# Patient Record
Sex: FEMALE | Race: WHITE | URBAN - METROPOLITAN AREA
[De-identification: names, ages, dates, MRNs, and addresses within clinical notes are randomized per-mention and may not be internally consistent; named-entity substitution may affect disease eponyms.]

---

## 2022-12-28 ENCOUNTER — OFFICE VISIT (OUTPATIENT)
Dept: URBAN - METROPOLITAN AREA CLINIC 43 | Facility: CLINIC | Age: 52
End: 2022-12-28
Payer: COMMERCIAL

## 2022-12-28 DIAGNOSIS — E11.9 TYPE 2 DIABETES MELLITUS W/O COMPLICATION: Primary | ICD-10-CM

## 2022-12-28 DIAGNOSIS — Z79.84 LONG TERM (CURRENT) USE OF ORAL HYPOGLYCEMIC DRUGS: ICD-10-CM

## 2022-12-28 DIAGNOSIS — H25.13 AGE-RELATED NUCLEAR CATARACT, BILATERAL: ICD-10-CM

## 2022-12-28 DIAGNOSIS — G45.3 AMAUROSIS FUGAX: ICD-10-CM

## 2022-12-28 PROCEDURE — 92004 COMPRE OPH EXAM NEW PT 1/>: CPT

## 2022-12-28 ASSESSMENT — KERATOMETRY
OS: 43.88
OD: 43.75

## 2022-12-28 ASSESSMENT — VISUAL ACUITY
OS: 20/25
OD: 20/25

## 2022-12-28 ASSESSMENT — INTRAOCULAR PRESSURE
OD: 17
OS: 16

## 2022-12-28 NOTE — IMPRESSION/PLAN
Impression: Age-related nuclear cataract, bilateral: H25.13. Plan: Patient advised there is a cataract, but that visual function is good, and cataract surgery is not required at this time. Further advised there is no specific urgency for cataract surgery. They were also advised that having cataract surgery does not mean they will not need to use glasses or contact lenses. We will continue to monitor and they are advised to call or return if any new symptoms.

## 2022-12-28 NOTE — IMPRESSION/PLAN
Impression: Amaurosis fugax: G45.3. Plan: Painless vision loss episodes lasting about 45 minutes. Recommend CBC w/ diff, ESR, CRP, PT/PTT, serum electrolytes, BUN, creatinine, fasting glucose, fasting lipid panel, CK, and troponin. Will have pt order labs through PCP. Notes sent to PCP. Advised pt to get labs done in the next day or two. Urged pt to go to ER if any pain develops associated with the vision loss. RTC 4 weeks with Dr. Franklin Stinson for follow-up.

## 2022-12-28 NOTE — IMPRESSION/PLAN
Impression: Type 2 diabetes mellitus w/o complication: K56.4. Plan: Ed pt on clinical findings. DWP that there is no retinopathy or macular edema present on exam today. Reminded pt that ADA recommends target A1c of 7.0 or less to minimize risk of retinopathy as well as other systemic complications of DM. Advised pt to RTC if vision changes. Recommend yearly DFE w/ fundus photos and MAC OCT to detect early signs of diabetic retinopathy. Advised pt to visit with PCP regularly, will send today's notes to PCP.